# Patient Record
Sex: MALE | Race: WHITE
[De-identification: names, ages, dates, MRNs, and addresses within clinical notes are randomized per-mention and may not be internally consistent; named-entity substitution may affect disease eponyms.]

---

## 2017-06-26 PROBLEM — Z00.00 ENCOUNTER FOR PREVENTIVE HEALTH EXAMINATION: Status: ACTIVE | Noted: 2017-06-26

## 2017-06-27 ENCOUNTER — APPOINTMENT (OUTPATIENT)
Dept: OTOLARYNGOLOGY | Facility: CLINIC | Age: 67
End: 2017-06-27

## 2017-06-27 VITALS
OXYGEN SATURATION: 97 % | TEMPERATURE: 97.6 F | DIASTOLIC BLOOD PRESSURE: 82 MMHG | SYSTOLIC BLOOD PRESSURE: 154 MMHG | HEART RATE: 59 BPM

## 2017-06-27 VITALS — HEIGHT: 68 IN | BODY MASS INDEX: 27.28 KG/M2 | WEIGHT: 180 LBS

## 2017-06-27 DIAGNOSIS — K13.70 UNSPECIFIED LESIONS OF ORAL MUCOSA: ICD-10-CM

## 2019-06-28 ENCOUNTER — APPOINTMENT (OUTPATIENT)
Dept: COLORECTAL SURGERY | Facility: CLINIC | Age: 69
End: 2019-06-28
Payer: MEDICARE

## 2019-06-28 VITALS
TEMPERATURE: 98.5 F | BODY MASS INDEX: 27.74 KG/M2 | HEIGHT: 68 IN | DIASTOLIC BLOOD PRESSURE: 77 MMHG | WEIGHT: 183 LBS | SYSTOLIC BLOOD PRESSURE: 134 MMHG | HEART RATE: 56 BPM

## 2019-06-28 DIAGNOSIS — G47.30 SLEEP APNEA, UNSPECIFIED: ICD-10-CM

## 2019-06-28 DIAGNOSIS — J45.909 UNSPECIFIED ASTHMA, UNCOMPLICATED: ICD-10-CM

## 2019-06-28 DIAGNOSIS — Z80.0 FAMILY HISTORY OF MALIGNANT NEOPLASM OF DIGESTIVE ORGANS: ICD-10-CM

## 2019-06-28 DIAGNOSIS — I10 ESSENTIAL (PRIMARY) HYPERTENSION: ICD-10-CM

## 2019-06-28 DIAGNOSIS — Z87.891 PERSONAL HISTORY OF NICOTINE DEPENDENCE: ICD-10-CM

## 2019-06-28 DIAGNOSIS — Z82.5 FAMILY HISTORY OF ASTHMA AND OTHER CHRONIC LOWER RESPIRATORY DISEASES: ICD-10-CM

## 2019-06-28 PROCEDURE — 99214 OFFICE O/P EST MOD 30 MIN: CPT | Mod: 25

## 2019-06-28 PROCEDURE — 11104 PUNCH BX SKIN SINGLE LESION: CPT

## 2019-06-28 PROCEDURE — 46600 DIAGNOSTIC ANOSCOPY SPX: CPT

## 2019-06-28 RX ORDER — METHYLCELLULOSE 500 MG/1
TABLET ORAL
Refills: 0 | Status: ACTIVE | COMMUNITY

## 2019-06-28 RX ORDER — FAMOTIDINE 40 MG/1
40 TABLET, FILM COATED ORAL
Refills: 0 | Status: ACTIVE | COMMUNITY

## 2019-06-28 RX ORDER — NEBIVOLOL HYDROCHLORIDE 10 MG/1
10 TABLET ORAL
Refills: 0 | Status: ACTIVE | COMMUNITY

## 2019-06-28 RX ORDER — FLUOCINOLONE ACETONIDE 0.25 MG/G
0.03 OINTMENT TOPICAL TWICE DAILY
Qty: 1 | Refills: 1 | Status: ACTIVE | COMMUNITY
Start: 2019-06-28 | End: 1900-01-01

## 2019-06-28 RX ORDER — NIFEDIPINE 90 MG/1
90 TABLET, FILM COATED, EXTENDED RELEASE ORAL
Refills: 0 | Status: ACTIVE | COMMUNITY

## 2019-06-28 RX ORDER — IRBESARTAN 75 MG/1
75 TABLET ORAL
Refills: 0 | Status: ACTIVE | COMMUNITY

## 2019-06-28 RX ORDER — ASPIRIN 81 MG
81 TABLET, DELAYED RELEASE (ENTERIC COATED) ORAL
Refills: 0 | Status: ACTIVE | COMMUNITY

## 2019-06-28 RX ORDER — DIAZEPAM 5 MG/1
TABLET ORAL
Refills: 0 | Status: ACTIVE | COMMUNITY

## 2019-06-28 RX ORDER — LAMOTRIGINE 100 MG/1
100 TABLET, FILM COATED, EXTENDED RELEASE ORAL
Refills: 0 | Status: ACTIVE | COMMUNITY

## 2019-06-28 RX ORDER — ATORVASTATIN CALCIUM 20 MG/1
20 TABLET, FILM COATED ORAL
Refills: 0 | Status: ACTIVE | COMMUNITY

## 2019-07-01 NOTE — ASSESSMENT
[FreeTextEntry1] : Advised the patient of the etiology and treatment of pruritus ani.  Recommendations including appropriate perianal hygiene changes, avoidance of soaps and astringents, lubricating lotions and avoidance of excessive wiping detailed.\par \par Advised increasing fiber regimen.\par \par A trial of topical steroid cream was recommended.\par \par Follow up pathology results.\par \par \par Advised return for possible rubber band ligation if bleeding is persistent -  off aspirin.

## 2019-07-01 NOTE — CONSULT LETTER
[Dear  ___] : Dear  [unfilled], [Consult Letter:] : I had the pleasure of evaluating your patient, [unfilled]. [( Thank you for referring [unfilled] for consultation for _____ )] : Thank you for referring [unfilled] for consultation for [unfilled] [Please see my note below.] : Please see my note below. [Consult Closing:] : Thank you very much for allowing me to participate in the care of this patient.  If you have any questions, please do not hesitate to contact me. [Sincerely,] : Sincerely, [FreeTextEntry2] : KALPESH JUNIOR\par [address], \[place] [address]\par  [FreeTextEntry3] : REFUGIO LOPEZ MD

## 2019-07-01 NOTE — PHYSICAL EXAM
[Excoriation] : excoriations [None] : there was no rectal mass  [Normal] : was normal [de-identified] : Moderate perianal erythema with thickened folds of the skin of the anal margin. Left anterior area of white plaque-like changes. Biopsy x  4 [FreeTextEntry1] : A lighted anoscope was passed into the anal canal and the entire anal mucosal surface was inspected..  THe findings revealed moderate internal hemorrhoids. No masses or lesions were identified.\par \par

## 2019-07-01 NOTE — HISTORY OF PRESENT ILLNESS
[FreeTextEntry1] : 69 y/o M presents for evaluation rectal bleeding and pain\par Reports sharp pain regardless of BM's\par Reports BRBPR in the bowl with almost every BM. Also reports itching that occurs regardless of BM\par H/o hemorrhoids and anal fistula, never had surgery for either issue. Also reports h/o anorectal eczema\par Denies drainage, or rectal swelling, fever, chills\par BH: reports being slightly irregularly recently. Going daily but reports occasional sensation of incomplete evacuation  \par Taking Citrucel daily. Reports adequate dietary fiber and water intake\par Last colonoscopy completed 10/18, normal findings per patient \par Taking ASA 81 mg, last taken this morning

## 2019-08-02 ENCOUNTER — TRANSCRIPTION ENCOUNTER (OUTPATIENT)
Age: 69
End: 2019-08-02

## 2019-08-02 ENCOUNTER — APPOINTMENT (OUTPATIENT)
Dept: COLORECTAL SURGERY | Facility: CLINIC | Age: 69
End: 2019-08-02
Payer: MEDICARE

## 2019-08-02 VITALS
BODY MASS INDEX: 27.58 KG/M2 | HEIGHT: 68 IN | HEART RATE: 56 BPM | SYSTOLIC BLOOD PRESSURE: 137 MMHG | TEMPERATURE: 98.2 F | WEIGHT: 182 LBS | DIASTOLIC BLOOD PRESSURE: 81 MMHG

## 2019-08-02 DIAGNOSIS — K60.2 ANAL FISSURE, UNSPECIFIED: ICD-10-CM

## 2019-08-02 PROCEDURE — 99213 OFFICE O/P EST LOW 20 MIN: CPT

## 2019-08-02 NOTE — HISTORY OF PRESENT ILLNESS
[FreeTextEntry1] : 67 yo M presents for 1 month f/u of pruritis ani and excoriation\par \par s/p biopsies to left anterior area of white plaque-like changes on 6/28/2019\par Patient started on Synalar 0.025% ointment BID, itching has since resolved\par Reports BRBPR bleeding has resolved, hemorrhoids still protrude but improved\par \par Has stopped using soap during bathing, only water\par BH: 2-3 times daily, occasional straining and incomplete emptying\par Uses TP to wipe and admits to repeated wiping during irritation\par \par Drinks 5-6 cups green tea daily, occasional black tea, denies caffeine intake\par Takes Citrucel twice daily, daily vegetable intake\par Takes ASA 81 mg daily\par \par Surgical Final Report\par Final Diagnosis\par \par Left anterior anal margin, biopsy:\par - Anal skin showing acute and chronic inflammation with\par           reactive epithelial changes, parakeratosis and hyperkeratosis,\par           and cytologic changes suggestive of HPV effect.\par - Negative for high grade dysplasia.\par \par           NOTE:  Immunohistochemical studies show negative staining for CK7\par           and weak focal staining for CEA in the squamous epithelium.\par           Stain for S100 shows no evidence of melanocytic atypia.\par Addendum\par           Immunohistochemical studies demonstrate negative (non-block)\par           staining for p16 and positive staining for Ki-67\par           limited to the basal epithelial layer.  The findings do not\par           support the presence of high-risk HPV in this biopsy.

## 2019-08-02 NOTE — ASSESSMENT
[FreeTextEntry1] : I reviewed with the patient perianal hygiene instructions to prevent recurrence of his symptomatic perianal dermatitis. At this time advised cessation of steroid cream. Advised starting lubricant suppository and increasing fiber supplementation to improve bowel consistency.\par \par followup one year

## 2019-08-02 NOTE — PHYSICAL EXAM
[Posterior] : posteriorly [Excoriation] : no perianal excoriation [Normal] : was normal [None] : there was no rectal mass  [de-identified] : small posterior fissrue

## 2020-05-27 ENCOUNTER — APPOINTMENT (OUTPATIENT)
Dept: COLORECTAL SURGERY | Facility: CLINIC | Age: 70
End: 2020-05-27
Payer: MEDICARE

## 2020-05-27 PROCEDURE — 99213 OFFICE O/P EST LOW 20 MIN: CPT | Mod: 95

## 2020-05-27 RX ORDER — FLUOCINOLONE ACETONIDE 0.25 MG/G
0.03 OINTMENT TOPICAL TWICE DAILY
Qty: 1 | Refills: 0 | Status: ACTIVE | COMMUNITY
Start: 2020-05-27 | End: 1900-01-01

## 2020-05-27 NOTE — REASON FOR VISIT
[Medical Office: ([facility])___] : at the medical office located in  [Follow-Up: _____] : a [unfilled] follow-up visit [Other Location: e.g. School (Enter Location, City,State)___] : at [unfilled], at the time of the visit.

## 2020-05-29 NOTE — ASSESSMENT
[FreeTextEntry1] : Reviewed with patient the etiology and treatment of pruritus ani. Recommendations included proper perianal hygiene, avoidance of soaps and lubricating lotions that contain dyes and perfumes and avoidance of excessive wiping. Patient was advised to continue with Citrucel and continue to increase dietary fiber and water intake to maintain regularity. May continue use of Calmol-4 suppositories at bedtime as needed.\par \par A trial of a topical steroid cream was recommended for two weeks. If there is no improvement after two weeks of use will consider need to in office evaluation and possible biopsy. Due to ongoing COVID-19 pandemic patient is aware that office visits are currently limited to emergency visit. He verbalized his understanding and is agreeable to the above plan.

## 2020-05-29 NOTE — HISTORY OF PRESENT ILLNESS
[FreeTextEntry1] : 70 y/o M presents for evaluation of anal itching\par Last seen by Dr. Dimas 8/2/19. Previously treated for pruritus ani and anal fissure\par Since previous visit patient has been diligent with his diet. He has eliminated red meats and processed foods out. He has also increased his dietary fiber and water intake\par He reports that for the last several weeks he has noticed itching and erythema in the same spot that was previously biopsied. He has been using Calmol-4 suppositories nightly with minimal improvement in symptoms\par Denies pain, fever, chills, or drainage. Reports bleeding initially on the TP, no longer experiencing bleeding\par BH: 2-3 times daily, soft and formed\par Denies constipation, diarrhea or straining\par Drink 3-4 cups of green tea daily. No soda or coffee use\par Using Citrucel BID (morning and night)\par Last colonoscopy completed 2 years ago with GI at [facility], scheduled for next colonoscopy in 2023\par

## 2022-04-20 ENCOUNTER — NON-APPOINTMENT (OUTPATIENT)
Age: 72
End: 2022-04-20

## 2022-04-20 ENCOUNTER — APPOINTMENT (OUTPATIENT)
Dept: COLORECTAL SURGERY | Facility: CLINIC | Age: 72
End: 2022-04-20
Payer: MEDICARE

## 2022-04-20 VITALS
SYSTOLIC BLOOD PRESSURE: 153 MMHG | BODY MASS INDEX: 26.07 KG/M2 | WEIGHT: 172 LBS | HEIGHT: 68 IN | HEART RATE: 58 BPM | TEMPERATURE: 97.9 F | DIASTOLIC BLOOD PRESSURE: 83 MMHG

## 2022-04-20 DIAGNOSIS — L29.0 PRURITUS ANI: ICD-10-CM

## 2022-04-20 PROCEDURE — 46600 DIAGNOSTIC ANOSCOPY SPX: CPT

## 2022-04-20 PROCEDURE — 99213 OFFICE O/P EST LOW 20 MIN: CPT | Mod: 25

## 2022-04-20 NOTE — HISTORY OF PRESENT ILLNESS
[FreeTextEntry1] : 70 yo M presents for possible abscess\par Previously seen for anal fissure and pruritus ani\par h/o office biopsies to left anterior area of white plaque like changes on 6/28/19, started on Synalar\par Surgical Final Report\par Final Diagnosis\par \par Left anterior anal margin, biopsy:\par - Anal skin showing acute and chronic inflammation with\par  reactive epithelial changes, parakeratosis and hyperkeratosis,\par  and cytologic changes suggestive of HPV effect.\par - Negative for high grade dysplasia.\par \par  NOTE: Immunohistochemical studies show negative staining for CK7\par  and weak focal staining for CEA in the squamous epithelium.\par  Stain for S100 shows no evidence of melanocytic atypia.\par Addendum\par  Immunohistochemical studies demonstrate negative (non-block)\par  staining for p16 and positive staining for Ki-67\par  limited to the basal epithelial layer. The findings do not\par  support the presence of high-risk HPV in this biopsy. \par \par Last consultation with SILVIANO Bates completed via [facility] on 5/27/2020, refill of Synalar  prescribed\par \par c/o\par Denies\par BH:\par \par \par Last colonoscopy approx 2018, next repeat recommended 2023

## 2022-04-20 NOTE — ASSESSMENT
[FreeTextEntry1] : I have discussed with the patient I suspect that he had an exacerbation of his recurrent perianal dermatitis.  It appears that he has significantly improved.\par \par Recommend conservative management.  Avoiding astringents.  Avoiding soaps.\par \par If symptoms recur would restart a course of topical steroid cream.

## 2022-04-20 NOTE — PHYSICAL EXAM
[Normal] : was normal [None] : there was no rectal mass  [de-identified] : Minimal superficial areas of fissuring along the anal margin.  No focal ulceration.  No masses.  Mild decreased pigmentation. [FreeTextEntry1] : Medical assistant was present for the entire exam.\par \par Anoscopy was performed for evaluation of the patients rectal bleeding  history .\par The risks, benefits and alternatives were reviewed.\par \par A lighted anoscope was passed into the anal canal and the entire anal mucosal surface was inspected..  \par The findings revealed moderate internal hemorrhoids.\par No masses or lesions were identified.\par \par